# Patient Record
Sex: MALE | Race: ASIAN | URBAN - METROPOLITAN AREA
[De-identification: names, ages, dates, MRNs, and addresses within clinical notes are randomized per-mention and may not be internally consistent; named-entity substitution may affect disease eponyms.]

---

## 2022-10-03 ENCOUNTER — OFFICE VISIT (OUTPATIENT)
Dept: FAMILY MEDICINE CLINIC | Facility: CLINIC | Age: 22
End: 2022-10-03
Payer: COMMERCIAL

## 2022-10-03 VITALS
HEIGHT: 68.5 IN | TEMPERATURE: 98 F | RESPIRATION RATE: 18 BRPM | BODY MASS INDEX: 26.58 KG/M2 | SYSTOLIC BLOOD PRESSURE: 122 MMHG | DIASTOLIC BLOOD PRESSURE: 76 MMHG | WEIGHT: 177.38 LBS | HEART RATE: 72 BPM

## 2022-10-03 DIAGNOSIS — E66.3 OVERWEIGHT (BMI 25.0-29.9): ICD-10-CM

## 2022-10-03 DIAGNOSIS — Z13.89 SCREENING FOR GENITOURINARY CONDITION: ICD-10-CM

## 2022-10-03 DIAGNOSIS — Z13.0 SCREENING FOR IRON DEFICIENCY ANEMIA: ICD-10-CM

## 2022-10-03 DIAGNOSIS — R22.2 NODULE OF BUTTOCK: ICD-10-CM

## 2022-10-03 DIAGNOSIS — Z00.00 ENCOUNTER FOR ROUTINE ADULT HEALTH EXAMINATION WITHOUT ABNORMAL FINDINGS: Primary | ICD-10-CM

## 2022-10-03 DIAGNOSIS — Z13.220 SCREENING FOR LIPID DISORDERS: ICD-10-CM

## 2022-10-03 DIAGNOSIS — N47.1 PHIMOSIS OF PENIS: ICD-10-CM

## 2022-10-03 DIAGNOSIS — Z13.29 SCREENING FOR THYROID DISORDER: ICD-10-CM

## 2022-11-07 ENCOUNTER — LAB ENCOUNTER (OUTPATIENT)
Dept: LAB | Age: 22
End: 2022-11-07
Attending: STUDENT IN AN ORGANIZED HEALTH CARE EDUCATION/TRAINING PROGRAM
Payer: COMMERCIAL

## 2022-11-07 DIAGNOSIS — Z13.220 SCREENING FOR LIPID DISORDERS: ICD-10-CM

## 2022-11-07 DIAGNOSIS — Z13.89 SCREENING FOR GENITOURINARY CONDITION: ICD-10-CM

## 2022-11-07 DIAGNOSIS — Z13.0 SCREENING FOR IRON DEFICIENCY ANEMIA: ICD-10-CM

## 2022-11-07 DIAGNOSIS — Z13.29 SCREENING FOR THYROID DISORDER: ICD-10-CM

## 2022-11-07 LAB
ALBUMIN SERPL-MCNC: 4.3 G/DL (ref 3.4–5)
ALBUMIN/GLOB SERPL: 1.4 {RATIO} (ref 1–2)
ALP LIVER SERPL-CCNC: 63 U/L
ALT SERPL-CCNC: 27 U/L
ANION GAP SERPL CALC-SCNC: 7 MMOL/L (ref 0–18)
AST SERPL-CCNC: 12 U/L (ref 15–37)
BASOPHILS # BLD AUTO: 0.02 X10(3) UL (ref 0–0.2)
BASOPHILS NFR BLD AUTO: 0.3 %
BILIRUB SERPL-MCNC: 0.7 MG/DL (ref 0.1–2)
BILIRUB UR QL: NEGATIVE
BUN BLD-MCNC: 14 MG/DL (ref 7–18)
BUN/CREAT SERPL: 13.9 (ref 10–20)
CALCIUM BLD-MCNC: 9.9 MG/DL (ref 8.5–10.1)
CHLORIDE SERPL-SCNC: 103 MMOL/L (ref 98–112)
CHOLEST SERPL-MCNC: 165 MG/DL (ref ?–200)
CLARITY UR: CLEAR
CO2 SERPL-SCNC: 29 MMOL/L (ref 21–32)
COLOR UR: YELLOW
CREAT BLD-MCNC: 1.01 MG/DL
DEPRECATED RDW RBC AUTO: 40.9 FL (ref 35.1–46.3)
EOSINOPHIL # BLD AUTO: 0.17 X10(3) UL (ref 0–0.7)
EOSINOPHIL NFR BLD AUTO: 2.9 %
ERYTHROCYTE [DISTWIDTH] IN BLOOD BY AUTOMATED COUNT: 12.1 % (ref 11–15)
FASTING PATIENT LIPID ANSWER: YES
FASTING STATUS PATIENT QL REPORTED: YES
GFR SERPLBLD BASED ON 1.73 SQ M-ARVRAT: 107 ML/MIN/1.73M2 (ref 60–?)
GLOBULIN PLAS-MCNC: 3.1 G/DL (ref 2.8–4.4)
GLUCOSE BLD-MCNC: 87 MG/DL (ref 70–99)
GLUCOSE UR-MCNC: NEGATIVE MG/DL
HCT VFR BLD AUTO: 49.6 %
HDLC SERPL-MCNC: 54 MG/DL (ref 40–59)
HGB BLD-MCNC: 16.3 G/DL
HGB UR QL STRIP.AUTO: NEGATIVE
IMM GRANULOCYTES # BLD AUTO: 0.01 X10(3) UL (ref 0–1)
IMM GRANULOCYTES NFR BLD: 0.2 %
KETONES UR-MCNC: NEGATIVE MG/DL
LDLC SERPL CALC-MCNC: 99 MG/DL (ref ?–100)
LEUKOCYTE ESTERASE UR QL STRIP.AUTO: NEGATIVE
LYMPHOCYTES # BLD AUTO: 1.88 X10(3) UL (ref 1–4)
LYMPHOCYTES NFR BLD AUTO: 31.6 %
MCH RBC QN AUTO: 30.1 PG (ref 26–34)
MCHC RBC AUTO-ENTMCNC: 32.9 G/DL (ref 31–37)
MCV RBC AUTO: 91.7 FL
MONOCYTES # BLD AUTO: 0.37 X10(3) UL (ref 0.1–1)
MONOCYTES NFR BLD AUTO: 6.2 %
NEUTROPHILS # BLD AUTO: 3.49 X10 (3) UL (ref 1.5–7.7)
NEUTROPHILS # BLD AUTO: 3.49 X10(3) UL (ref 1.5–7.7)
NEUTROPHILS NFR BLD AUTO: 58.8 %
NITRITE UR QL STRIP.AUTO: NEGATIVE
NONHDLC SERPL-MCNC: 111 MG/DL (ref ?–130)
OSMOLALITY SERPL CALC.SUM OF ELEC: 288 MOSM/KG (ref 275–295)
PH UR: 7 [PH] (ref 5–8)
PLATELET # BLD AUTO: 217 10(3)UL (ref 150–450)
POTASSIUM SERPL-SCNC: 4.3 MMOL/L (ref 3.5–5.1)
PROT SERPL-MCNC: 7.4 G/DL (ref 6.4–8.2)
PROT UR-MCNC: NEGATIVE MG/DL
RBC # BLD AUTO: 5.41 X10(6)UL
SODIUM SERPL-SCNC: 139 MMOL/L (ref 136–145)
SP GR UR STRIP: 1.01 (ref 1–1.03)
TRIGL SERPL-MCNC: 63 MG/DL (ref 30–149)
TSI SER-ACNC: 2.45 MIU/ML (ref 0.36–3.74)
UROBILINOGEN UR STRIP-ACNC: <2
VIT C UR-MCNC: NEGATIVE MG/DL
VLDLC SERPL CALC-MCNC: 10 MG/DL (ref 0–30)
WBC # BLD AUTO: 5.9 X10(3) UL (ref 4–11)

## 2022-11-07 PROCEDURE — 80061 LIPID PANEL: CPT

## 2022-11-07 PROCEDURE — 84443 ASSAY THYROID STIM HORMONE: CPT

## 2022-11-07 PROCEDURE — 81003 URINALYSIS AUTO W/O SCOPE: CPT

## 2022-11-07 PROCEDURE — 80053 COMPREHEN METABOLIC PANEL: CPT

## 2022-11-07 PROCEDURE — 85025 COMPLETE CBC W/AUTO DIFF WBC: CPT

## 2022-12-13 ENCOUNTER — OFFICE VISIT (OUTPATIENT)
Facility: LOCATION | Age: 22
End: 2022-12-13
Payer: COMMERCIAL

## 2022-12-13 VITALS — HEART RATE: 62 BPM | TEMPERATURE: 97 F

## 2022-12-13 DIAGNOSIS — L05.91 PILONIDAL CYST: Primary | ICD-10-CM

## 2022-12-13 PROCEDURE — 99204 OFFICE O/P NEW MOD 45 MIN: CPT | Performed by: COLON & RECTAL SURGERY

## 2022-12-13 NOTE — PATIENT INSTRUCTIONS
I am seeing this patient in consultation regarding a pilonidal cyst.    Patient states he has had pus and drainage in the region to the left of the posterior midline in the gluteal cleft on and off since September 2020. States it builds up, pops, drains, and a cycle. He has pain at the site. It is 5/10 today. It has not been draining for the last few days. The patient has no constipation or diarrhea. He has no bright red blood from the rectum. He has no melena. He has no narrowing of the stool or mucus in the stool. He has no abdominal pain or distention. He has not suffered weight loss as a symptom. He has no family history of colon cancer. He is never been treated on the anus or rectum for either anal rectal abscess, hemorrhoids, or fistula. He has never had any surgery or drainage at the pilonidal site by operation. The patient has never been diagnosed with Crohn's disease, ulcerative colitis, or irritable bowel syndrome. Is not on any blood thinners. He has no significant illnesses. He is not on any blood thinners. Clinical exam today shows him to have a pilonidal recurring cyst and abscess in the left of the gluteal cleft at the top of the gluteal cleft approximately 4 cm off the midline. I popped the pointing site of the cyst and was able to track a sinus all the way to the midline and on the way near the tip of the tailbone spanning approximately 7 cm. It only drained blood no pus. The anus shows no fissures, fistulae, hemorrhoids, or any external disease. This patient will require pilonidal cystectomy. Since it has been stable for years, and it easily drains now, he will not require emergency surgery. The patient is at school. 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 He will be able to schedule the operation on one of his breaks. I had a very long conversation with the patient, and his mother on the phone.     I described complete excisional therapy, shaving the region, dressing changes, prolonged wound healing, and the need to be seen by the [address] [place] [address] services for assistance with dressing changes, possibly the [facility] in [facility], and return trips to me on a periodic basis. He will be scheduling the procedure through our office. He is currently scheduled for pilonidal cystectomy. The operation is on the books for January 27, 2023. All risks, benefits, complications and alternatives to the proposed procedure(s) were fully discussed with the patient. All questions from the patient were answered in detail. A description of the procedure(s) and possible outcomes was fully discussed. The patient seemed to understand the conversation and its details. Consent for the procedure(s) was confirmed with the patient.

## 2023-01-16 ENCOUNTER — TELEPHONE (OUTPATIENT)
Dept: SURGERY | Facility: CLINIC | Age: 23
End: 2023-01-16

## 2023-01-16 ENCOUNTER — OFFICE VISIT (OUTPATIENT)
Dept: SURGERY | Facility: CLINIC | Age: 23
End: 2023-01-16

## 2023-01-16 DIAGNOSIS — N47.1 PHIMOSIS: Primary | ICD-10-CM

## 2023-01-16 LAB
APPEARANCE: CLEAR
BILIRUBIN: NEGATIVE
GLUCOSE (URINE DIPSTICK): NEGATIVE MG/DL
KETONES (URINE DIPSTICK): NEGATIVE MG/DL
LEUKOCYTES: NEGATIVE
MULTISTIX LOT#: NORMAL NUMERIC
NITRITE, URINE: NEGATIVE
OCCULT BLOOD: NEGATIVE
PH, URINE: 6.5 (ref 4.5–8)
PROTEIN (URINE DIPSTICK): NEGATIVE MG/DL
SPECIFIC GRAVITY: 1.01 (ref 1–1.03)
UROBILINOGEN,SEMI-QN: 0.2 MG/DL (ref 0–1.9)

## 2023-01-16 PROCEDURE — 99204 OFFICE O/P NEW MOD 45 MIN: CPT | Performed by: UROLOGY

## 2023-01-16 PROCEDURE — 81003 URINALYSIS AUTO W/O SCOPE: CPT | Performed by: UROLOGY

## 2023-01-19 ENCOUNTER — TELEPHONE (OUTPATIENT)
Facility: LOCATION | Age: 23
End: 2023-01-19

## 2023-01-19 NOTE — TELEPHONE ENCOUNTER
Called patient this date and he is unsure of his schedule and is thinking of doing this possibly in May. He will call me directly at [phone] when ready to schedule.

## 2023-01-19 NOTE — TELEPHONE ENCOUNTER
Initiated authorization for Pilonidal Cystectomy CPT 32311 with Arjun  Case #[id number].   Status: Approved-authorization is not required per health plan for participating providers

## 2023-01-27 ENCOUNTER — HOSPITAL ENCOUNTER (OUTPATIENT)
Facility: HOSPITAL | Age: 23
Setting detail: HOSPITAL OUTPATIENT SURGERY
Discharge: HOME | End: 2023-01-27
Attending: COLON & RECTAL SURGERY | Admitting: COLON & RECTAL SURGERY
Payer: COMMERCIAL

## 2023-01-27 ENCOUNTER — ANESTHESIA EVENT (OUTPATIENT)
Dept: SURGERY | Facility: HOSPITAL | Age: 23
End: 2023-01-27
Payer: COMMERCIAL

## 2023-01-27 ENCOUNTER — ANESTHESIA (OUTPATIENT)
Dept: SURGERY | Facility: HOSPITAL | Age: 23
End: 2023-01-27
Payer: COMMERCIAL

## 2023-01-27 VITALS
TEMPERATURE: 98 F | RESPIRATION RATE: 14 BRPM | WEIGHT: 174 LBS | DIASTOLIC BLOOD PRESSURE: 62 MMHG | OXYGEN SATURATION: 99 % | BODY MASS INDEX: 26.07 KG/M2 | HEIGHT: 68.5 IN | HEART RATE: 73 BPM | SYSTOLIC BLOOD PRESSURE: 117 MMHG

## 2023-01-27 DIAGNOSIS — L05.91 PILONIDAL CYST: Primary | ICD-10-CM

## 2023-01-27 PROCEDURE — 0HB8XZZ EXCISION OF BUTTOCK SKIN, EXTERNAL APPROACH: ICD-10-PCS | Performed by: COLON & RECTAL SURGERY

## 2023-01-27 PROCEDURE — 88304 TISSUE EXAM BY PATHOLOGIST: CPT | Performed by: COLON & RECTAL SURGERY

## 2023-01-27 RX ORDER — SODIUM CHLORIDE, SODIUM LACTATE, POTASSIUM CHLORIDE, CALCIUM CHLORIDE 600; 310; 30; 20 MG/100ML; MG/100ML; MG/100ML; MG/100ML
INJECTION, SOLUTION INTRAVENOUS CONTINUOUS
Status: DISCONTINUED | OUTPATIENT
Start: 2023-01-27 | End: 2023-01-27

## 2023-01-27 RX ORDER — KETOROLAC TROMETHAMINE 30 MG/ML
INJECTION, SOLUTION INTRAMUSCULAR; INTRAVENOUS AS NEEDED
Status: DISCONTINUED | OUTPATIENT
Start: 2023-01-27 | End: 2023-01-27 | Stop reason: SURG

## 2023-01-27 RX ORDER — MAGNESIUM HYDROXIDE 1200 MG/15ML
50 LIQUID ORAL 3 TIMES DAILY
Qty: 1000 ML | Refills: 3 | Status: SHIPPED | OUTPATIENT
Start: 2023-01-27

## 2023-01-27 RX ORDER — HYDROMORPHONE HYDROCHLORIDE 1 MG/ML
0.6 INJECTION, SOLUTION INTRAMUSCULAR; INTRAVENOUS; SUBCUTANEOUS EVERY 5 MIN PRN
Status: DISCONTINUED | OUTPATIENT
Start: 2023-01-27 | End: 2023-01-27

## 2023-01-27 RX ORDER — HYDROCODONE BITARTRATE AND ACETAMINOPHEN 5; 325 MG/1; MG/1
1 TABLET ORAL ONCE AS NEEDED
Status: DISCONTINUED | OUTPATIENT
Start: 2023-01-27 | End: 2023-01-27

## 2023-01-27 RX ORDER — GLYCOPYRROLATE 0.2 MG/ML
INJECTION, SOLUTION INTRAMUSCULAR; INTRAVENOUS AS NEEDED
Status: DISCONTINUED | OUTPATIENT
Start: 2023-01-27 | End: 2023-01-27 | Stop reason: SURG

## 2023-01-27 RX ORDER — HYDROMORPHONE HYDROCHLORIDE 1 MG/ML
0.4 INJECTION, SOLUTION INTRAMUSCULAR; INTRAVENOUS; SUBCUTANEOUS EVERY 5 MIN PRN
Status: DISCONTINUED | OUTPATIENT
Start: 2023-01-27 | End: 2023-01-27

## 2023-01-27 RX ORDER — PROCHLORPERAZINE EDISYLATE 5 MG/ML
5 INJECTION INTRAMUSCULAR; INTRAVENOUS EVERY 8 HOURS PRN
Status: DISCONTINUED | OUTPATIENT
Start: 2023-01-27 | End: 2023-01-27

## 2023-01-27 RX ORDER — ONDANSETRON 2 MG/ML
INJECTION INTRAMUSCULAR; INTRAVENOUS AS NEEDED
Status: DISCONTINUED | OUTPATIENT
Start: 2023-01-27 | End: 2023-01-27 | Stop reason: SURG

## 2023-01-27 RX ORDER — HYDROMORPHONE HYDROCHLORIDE 1 MG/ML
0.2 INJECTION, SOLUTION INTRAMUSCULAR; INTRAVENOUS; SUBCUTANEOUS EVERY 5 MIN PRN
Status: DISCONTINUED | OUTPATIENT
Start: 2023-01-27 | End: 2023-01-27

## 2023-01-27 RX ORDER — HEPARIN SODIUM 5000 [USP'U]/ML
5000 INJECTION, SOLUTION INTRAVENOUS; SUBCUTANEOUS ONCE
Status: COMPLETED | OUTPATIENT
Start: 2023-01-27 | End: 2023-01-27

## 2023-01-27 RX ORDER — SCOLOPAMINE TRANSDERMAL SYSTEM 1 MG/1
1 PATCH, EXTENDED RELEASE TRANSDERMAL ONCE
Status: DISCONTINUED | OUTPATIENT
Start: 2023-01-27 | End: 2023-01-27 | Stop reason: HOSPADM

## 2023-01-27 RX ORDER — DEXAMETHASONE SODIUM PHOSPHATE 4 MG/ML
VIAL (ML) INJECTION AS NEEDED
Status: DISCONTINUED | OUTPATIENT
Start: 2023-01-27 | End: 2023-01-27 | Stop reason: SURG

## 2023-01-27 RX ORDER — ACETAMINOPHEN 500 MG
1000 TABLET ORAL ONCE AS NEEDED
Status: DISCONTINUED | OUTPATIENT
Start: 2023-01-27 | End: 2023-01-27

## 2023-01-27 RX ORDER — MIDAZOLAM HYDROCHLORIDE 1 MG/ML
1 INJECTION INTRAMUSCULAR; INTRAVENOUS EVERY 5 MIN PRN
Status: DISCONTINUED | OUTPATIENT
Start: 2023-01-27 | End: 2023-01-27

## 2023-01-27 RX ORDER — HYDROCODONE BITARTRATE AND ACETAMINOPHEN 5; 325 MG/1; MG/1
2 TABLET ORAL ONCE AS NEEDED
Status: DISCONTINUED | OUTPATIENT
Start: 2023-01-27 | End: 2023-01-27

## 2023-01-27 RX ORDER — NALOXONE HYDROCHLORIDE 0.4 MG/ML
80 INJECTION, SOLUTION INTRAMUSCULAR; INTRAVENOUS; SUBCUTANEOUS AS NEEDED
Status: DISCONTINUED | OUTPATIENT
Start: 2023-01-27 | End: 2023-01-27

## 2023-01-27 RX ORDER — NEOSTIGMINE METHYLSULFATE 1 MG/ML
INJECTION, SOLUTION INTRAVENOUS AS NEEDED
Status: DISCONTINUED | OUTPATIENT
Start: 2023-01-27 | End: 2023-01-27 | Stop reason: SURG

## 2023-01-27 RX ORDER — BUPIVACAINE HYDROCHLORIDE AND EPINEPHRINE 5; 5 MG/ML; UG/ML
INJECTION, SOLUTION EPIDURAL; INTRACAUDAL; PERINEURAL AS NEEDED
Status: DISCONTINUED | OUTPATIENT
Start: 2023-01-27 | End: 2023-01-27 | Stop reason: HOSPADM

## 2023-01-27 RX ORDER — CEFAZOLIN SODIUM/WATER 2 G/20 ML
2 SYRINGE (ML) INTRAVENOUS ONCE
Status: COMPLETED | OUTPATIENT
Start: 2023-01-27 | End: 2023-01-27

## 2023-01-27 RX ORDER — ONDANSETRON 2 MG/ML
4 INJECTION INTRAMUSCULAR; INTRAVENOUS EVERY 6 HOURS PRN
Status: DISCONTINUED | OUTPATIENT
Start: 2023-01-27 | End: 2023-01-27

## 2023-01-27 RX ORDER — MEPERIDINE HYDROCHLORIDE 25 MG/ML
12.5 INJECTION INTRAMUSCULAR; INTRAVENOUS; SUBCUTANEOUS AS NEEDED
Status: DISCONTINUED | OUTPATIENT
Start: 2023-01-27 | End: 2023-01-27

## 2023-01-27 RX ORDER — ROCURONIUM BROMIDE 10 MG/ML
INJECTION, SOLUTION INTRAVENOUS AS NEEDED
Status: DISCONTINUED | OUTPATIENT
Start: 2023-01-27 | End: 2023-01-27 | Stop reason: SURG

## 2023-01-27 RX ORDER — ACETAMINOPHEN 500 MG
1000 TABLET ORAL ONCE
Status: DISCONTINUED | OUTPATIENT
Start: 2023-01-27 | End: 2023-01-27 | Stop reason: HOSPADM

## 2023-01-27 RX ORDER — HYDROCODONE BITARTRATE AND ACETAMINOPHEN 5; 325 MG/1; MG/1
1 TABLET ORAL EVERY 6 HOURS PRN
Qty: 20 TABLET | Refills: 0 | Status: SHIPPED | OUTPATIENT
Start: 2023-01-27

## 2023-01-27 RX ORDER — LIDOCAINE HYDROCHLORIDE 10 MG/ML
INJECTION, SOLUTION EPIDURAL; INFILTRATION; INTRACAUDAL; PERINEURAL AS NEEDED
Status: DISCONTINUED | OUTPATIENT
Start: 2023-01-27 | End: 2023-01-27 | Stop reason: SURG

## 2023-01-27 RX ADMIN — ONDANSETRON 4 MG: 2 INJECTION INTRAMUSCULAR; INTRAVENOUS at 14:25:00

## 2023-01-27 RX ADMIN — GLYCOPYRROLATE 0.6 MG: 0.2 INJECTION, SOLUTION INTRAMUSCULAR; INTRAVENOUS at 14:52:00

## 2023-01-27 RX ADMIN — LIDOCAINE HYDROCHLORIDE 50 MG: 10 INJECTION, SOLUTION EPIDURAL; INFILTRATION; INTRACAUDAL; PERINEURAL at 14:20:00

## 2023-01-27 RX ADMIN — DEXAMETHASONE SODIUM PHOSPHATE 8 MG: 4 MG/ML VIAL (ML) INJECTION at 14:25:00

## 2023-01-27 RX ADMIN — CEFAZOLIN SODIUM/WATER 2 G: 2 G/20 ML SYRINGE (ML) INTRAVENOUS at 14:20:00

## 2023-01-27 RX ADMIN — ROCURONIUM BROMIDE 30 MG: 10 INJECTION, SOLUTION INTRAVENOUS at 14:20:00

## 2023-01-27 RX ADMIN — SODIUM CHLORIDE, SODIUM LACTATE, POTASSIUM CHLORIDE, CALCIUM CHLORIDE: 600; 310; 30; 20 INJECTION, SOLUTION INTRAVENOUS at 14:16:00

## 2023-01-27 RX ADMIN — KETOROLAC TROMETHAMINE 30 MG: 30 INJECTION, SOLUTION INTRAMUSCULAR; INTRAVENOUS at 14:48:00

## 2023-01-27 RX ADMIN — SODIUM CHLORIDE, SODIUM LACTATE, POTASSIUM CHLORIDE, CALCIUM CHLORIDE: 600; 310; 30; 20 INJECTION, SOLUTION INTRAVENOUS at 14:52:00

## 2023-01-27 RX ADMIN — NEOSTIGMINE METHYLSULFATE 4 MG: 1 INJECTION, SOLUTION INTRAVENOUS at 14:52:00

## 2023-01-27 NOTE — OPERATIVE REPORT
[address] Note    [place] Location: OR   [facility] [id number] MRN [id number]   Admission Date 1/27/2023 Operation Date 1/27/2023   Attending Physician Pete White MD Operating Physician Sanna Rivera MD     Pre-Operative Diagnosis: Pilonidal cyst with sinus tracts and abscess [L05.91]    Post-Operative Diagnosis: Same as above    Procedure Performed: Pilonidal Cystectomy    Surgeon(s) and Role:     Eulis Hodgkins, MD - Primary  Assistant: Sachin Olson PA-C    Anesthesia: General        Operative findings:  The patient has a pilonidal cyst with sinus tracts  The entire region was excised to clean tissue margins  It is approximately 12 cm tall, its a hockey-stick pattern to the left of midline at the apex, 4 cm wide, 3 cm deep  The wound will be packed wet to dry  The patient was delivered to recovery room in stable condition    Operative Summary:  The patient was placed in the prone position on the operating room table with the hips flexed in the jackknife position. The perineum was painted with Betadine paint. The prep extends up into the gluteal cleft and to the lower back. Sterile drapes were placed with wide exposure of the perineum, the gluteal cleft, and the lower back. The pilonidal cyst and all sinus tracts were probed and the area of disease was mapped out with blue ink. A wide excision was performed back to normal healthy tissue. This was initiated with scalpel dissection and carried out with electrocautery. Once clean, healthy, tissue was established, the region was controlled for hemostasis with electrocautery. The specimen was sent to pathology for further histopathologic diagnosis. The wound margins were infiltrated with 0.5% Marcaine with epinephrine. A sterile wet-to-dry saline dressing was applied. The patient was delivered to recovery room in stable postoperative condition.       Pathologic Specimen: Pilonidal cyst and sinus tracts            EBL: 10 cc                     Girish Jacob MD  1/27/2023  3:08 PM

## 2023-01-27 NOTE — ANESTHESIA POSTPROCEDURE EVALUATION
[address] [place] [address] Patient Status:  Hospital Outpatient Surgery   Age/Gender 20year old male MRN [id number]   [facility] SURGERY Attending Karen Cehn MD   Hosp Day # 0 PCP Cesar Mccray MD       Anesthesia Post-op Note    PILONIDAL CYSTECTOMY    Procedure Summary     Date: 01/27/23 Room / Location: [address] [place] [address] OR 11 / 1404 [facility] OR    Anesthesia Start: 6007 Anesthesia Stop: 1501    Procedure: PILONIDAL CYSTECTOMY Diagnosis:       Pilonidal cyst      (Pilonidal cyst [N62.86])    Surgeons: Karen Chen MD Anesthesiologist: Earl Quiroz MD    Anesthesia Type: general ASA Status: 1          Anesthesia Type: general    Vitals Value Taken Time   /57 01/27/23 1501   Temp 98.4 01/27/23 1501   Pulse 110 01/27/23 1501   Resp 15 01/27/23 1501   SpO2 100 01/27/23 1501       Patient Location: PACU    Anesthesia Type: general    Airway Patency: patent and extubated    Postop Pain Control: adequate    Mental Status: preanesthetic baseline    Nausea/Vomiting: none    Cardiopulmonary/Hydration status: stable euvolemic    Complications: no apparent anesthesia related complications    Postop vital signs: stable    Dental Exam: Unchanged from Preop    Patient to be discharged from PACU when criteria met.

## 2023-01-27 NOTE — ANESTHESIA PROCEDURE NOTES
Airway  Date/Time: 1/27/2023 2:22 PM  Urgency: elective    Airway not difficult    General Information and Staff    Patient location during procedure: OR  Anesthesiologist: Estela Viera MD  Resident/CRNA: Juma Farris CRNA  Performed: CRNA     Indications and Patient Condition  Indications for airway management: anesthesia  Spontaneous Ventilation: absent  Sedation level: deep  Preoxygenated: yes  Patient position: sniffing  Mask difficulty assessment: 2 - vent by mask + OA or adjuvant +/- NMBA    Final Airway Details  Final airway type: endotracheal airway      Successful airway: ETT  Cuffed: yes   Successful intubation technique: direct laryngoscopy  Facilitating devices/methods: intubating stylet  Endotracheal tube insertion site: oral  Blade: Mitali  Blade size: #4  ETT size (mm): 7.5    Cormack-Lehane Classification: grade IIA - partial view of glottis  Placement verified by: chest auscultation and capnometry   Cuff volume (mL): 7  Measured from: lips  ETT to lips (cm): 22  Number of attempts at approach: 1  Number of other approaches attempted: 0    Additional Comments  Dentition per pre op

## 2023-02-13 ENCOUNTER — OFFICE VISIT (OUTPATIENT)
Facility: LOCATION | Age: 23
End: 2023-02-13

## 2023-02-13 DIAGNOSIS — L05.91 PILONIDAL CYST: Primary | ICD-10-CM

## 2023-02-13 PROCEDURE — 99024 POSTOP FOLLOW-UP VISIT: CPT | Performed by: COLON & RECTAL SURGERY

## 2023-02-13 NOTE — PATIENT INSTRUCTIONS
This patient underwent complex pilonidal cystectomy on January 27, 2023. He is doing wet-to-dry dressings at home. He has no new complaints. He is tolerating the dressing changes. He has no reports of cellulitis, further infection, secondary tracts, or other complaints at the operative site. He has no fever or chills. Clinical exam of the gluteal cleft reveals him to have a 10 cm tall, 4 cm wide, 3.5 cm deep granulation bed. It is beautiful red granulation tissue without pus, undermining, secondary tracts, residual cyst or sinuses. There is no surrounding erythema or cellulitis. They have demonstrated good technique with the wet-to-dry dressings. This patient may resume all but extreme sports. I will see him again in 1 month. He should continue the 3 times daily dressing changes.

## 2023-03-13 ENCOUNTER — OFFICE VISIT (OUTPATIENT)
Facility: LOCATION | Age: 23
End: 2023-03-13

## 2023-03-13 VITALS — TEMPERATURE: 97 F | HEART RATE: 65 BPM

## 2023-03-13 DIAGNOSIS — L05.91 PILONIDAL CYST: Primary | ICD-10-CM

## 2023-03-13 PROCEDURE — 99024 POSTOP FOLLOW-UP VISIT: CPT | Performed by: COLON & RECTAL SURGERY

## 2023-03-20 RX ORDER — MAGNESIUM HYDROXIDE 1200 MG/15ML
50 LIQUID ORAL 3 TIMES DAILY
Qty: 1000 ML | Refills: 3 | Status: SHIPPED | OUTPATIENT
Start: 2023-03-20

## 2023-04-17 ENCOUNTER — OFFICE VISIT (OUTPATIENT)
Facility: LOCATION | Age: 23
End: 2023-04-17

## 2023-04-17 DIAGNOSIS — L05.91 PILONIDAL CYST: Primary | ICD-10-CM

## 2023-04-17 PROCEDURE — 99024 POSTOP FOLLOW-UP VISIT: CPT | Performed by: COLON & RECTAL SURGERY

## 2023-05-09 ENCOUNTER — OFFICE VISIT (OUTPATIENT)
Facility: LOCATION | Age: 23
End: 2023-05-09
Payer: COMMERCIAL

## 2023-05-09 VITALS — TEMPERATURE: 98 F | HEART RATE: 86 BPM

## 2023-05-09 DIAGNOSIS — L05.91 PILONIDAL CYST: Primary | ICD-10-CM

## 2023-05-09 PROCEDURE — 99213 OFFICE O/P EST LOW 20 MIN: CPT | Performed by: COLON & RECTAL SURGERY

## 2023-05-30 ENCOUNTER — OFFICE VISIT (OUTPATIENT)
Facility: LOCATION | Age: 23
End: 2023-05-30
Payer: COMMERCIAL

## 2023-05-30 VITALS — HEART RATE: 77 BPM | TEMPERATURE: 99 F

## 2023-05-30 DIAGNOSIS — L05.91 PILONIDAL CYST: Primary | ICD-10-CM

## 2023-05-30 PROCEDURE — 99213 OFFICE O/P EST LOW 20 MIN: CPT | Performed by: COLON & RECTAL SURGERY

## 2023-05-30 NOTE — PATIENT INSTRUCTIONS
This patient underwent pilonidal cystectomy on January 27, 2023. He is having success with dressing changes to the site. He states he remains with a very small open wound at the base of his very large prior pilonidal cystectomy incision. He has no fever or chills. No nausea or vomiting. He has no cellulitis. He reports no secondary tracts. He states that it is closing very fast and very well. Clinical exam reveals a 4 mm x 4 mm open granulation region. It took the opportunity to shave that area. There is no undermining, secondary sinus pits, secondary tracts, or evidence of a deeper pilonidal cyst.    I will see him again in 3 weeks.

## 2023-06-20 ENCOUNTER — OFFICE VISIT (OUTPATIENT)
Facility: LOCATION | Age: 23
End: 2023-06-20
Payer: COMMERCIAL

## 2023-06-20 VITALS — HEART RATE: 71 BPM | TEMPERATURE: 98 F

## 2023-06-20 DIAGNOSIS — L05.91 PILONIDAL CYST: Primary | ICD-10-CM

## 2023-06-20 PROCEDURE — 99213 OFFICE O/P EST LOW 20 MIN: CPT | Performed by: COLON & RECTAL SURGERY

## 2023-06-20 NOTE — PATIENT INSTRUCTIONS
This patient underwent pilonidal cystectomy on January 27, 2023. He has been doing dressing changes. The wound was deep. It is now completely healed. He still sees a little bit of spot of drainage, but he feels that the wound is completely sealed. He has no fever or chills. He has no purulent drainage. He has not had cellulitis. He does not report any secondary tracts or sinus pits. Clinical exam reveals the skin to be completely sealed over without granulation. There is no bloody drainage on today's exam.  There is no evidence of recurrence of the cyst or sinus tract. There is no undermining. The scar is slightly indurated. I have no further follow-up scheduled with this patient at this time. This patient can see me on an as-needed basis. This patient should return urgently for any problems or complications related to my surgical intervention. I counseled the patient regarding possible recurrence. He has been instructed to see me urgently for any problems at the operative site.

## 2023-08-03 ENCOUNTER — OFFICE VISIT (OUTPATIENT)
Facility: LOCATION | Age: 23
End: 2023-08-03
Payer: COMMERCIAL

## 2023-08-03 VITALS — TEMPERATURE: 97 F | HEART RATE: 62 BPM

## 2023-08-03 DIAGNOSIS — L05.91 PILONIDAL CYST: Primary | ICD-10-CM

## 2023-08-03 PROCEDURE — 99203 OFFICE O/P NEW LOW 30 MIN: CPT | Performed by: SURGERY

## 2023-08-17 ENCOUNTER — OFFICE VISIT (OUTPATIENT)
Facility: LOCATION | Age: 23
End: 2023-08-17

## 2023-08-17 VITALS — TEMPERATURE: 98 F | HEART RATE: 86 BPM

## 2023-08-17 DIAGNOSIS — L05.91 PILONIDAL CYST: Primary | ICD-10-CM

## 2023-08-17 PROCEDURE — 99024 POSTOP FOLLOW-UP VISIT: CPT | Performed by: SURGERY

## 2023-10-09 ENCOUNTER — OFFICE VISIT (OUTPATIENT)
Dept: FAMILY MEDICINE CLINIC | Facility: CLINIC | Age: 23
End: 2023-10-09
Payer: COMMERCIAL

## 2023-10-09 VITALS
WEIGHT: 180.63 LBS | DIASTOLIC BLOOD PRESSURE: 60 MMHG | BODY MASS INDEX: 26.75 KG/M2 | TEMPERATURE: 98 F | HEIGHT: 69.09 IN | HEART RATE: 60 BPM | RESPIRATION RATE: 16 BRPM | SYSTOLIC BLOOD PRESSURE: 112 MMHG

## 2023-10-09 DIAGNOSIS — Z00.01 ENCOUNTER FOR ROUTINE ADULT HEALTH EXAMINATION WITH ABNORMAL FINDINGS: Primary | ICD-10-CM

## 2023-10-09 DIAGNOSIS — Z23 NEED FOR VACCINATION: ICD-10-CM

## 2023-10-09 PROCEDURE — 99395 PREV VISIT EST AGE 18-39: CPT | Performed by: STUDENT IN AN ORGANIZED HEALTH CARE EDUCATION/TRAINING PROGRAM

## 2023-10-09 PROCEDURE — 90471 IMMUNIZATION ADMIN: CPT | Performed by: STUDENT IN AN ORGANIZED HEALTH CARE EDUCATION/TRAINING PROGRAM

## 2023-10-09 PROCEDURE — 3074F SYST BP LT 130 MM HG: CPT | Performed by: STUDENT IN AN ORGANIZED HEALTH CARE EDUCATION/TRAINING PROGRAM

## 2023-10-09 PROCEDURE — 3008F BODY MASS INDEX DOCD: CPT | Performed by: STUDENT IN AN ORGANIZED HEALTH CARE EDUCATION/TRAINING PROGRAM

## 2023-10-09 PROCEDURE — 3078F DIAST BP <80 MM HG: CPT | Performed by: STUDENT IN AN ORGANIZED HEALTH CARE EDUCATION/TRAINING PROGRAM

## 2023-10-09 PROCEDURE — 90686 IIV4 VACC NO PRSV 0.5 ML IM: CPT | Performed by: STUDENT IN AN ORGANIZED HEALTH CARE EDUCATION/TRAINING PROGRAM

## 2023-10-13 ENCOUNTER — LAB ENCOUNTER (OUTPATIENT)
Dept: LAB | Age: 23
End: 2023-10-13
Attending: STUDENT IN AN ORGANIZED HEALTH CARE EDUCATION/TRAINING PROGRAM
Payer: COMMERCIAL

## 2023-10-13 LAB
ALBUMIN SERPL-MCNC: 4.6 G/DL (ref 3.4–5)
ALBUMIN/GLOB SERPL: 1.2 {RATIO} (ref 1–2)
ALP LIVER SERPL-CCNC: 59 U/L
ALT SERPL-CCNC: 33 U/L
ANION GAP SERPL CALC-SCNC: 6 MMOL/L (ref 0–18)
AST SERPL-CCNC: 13 U/L (ref 15–37)
BASOPHILS # BLD AUTO: 0.01 X10(3) UL (ref 0–0.2)
BASOPHILS NFR BLD AUTO: 0.2 %
BILIRUB SERPL-MCNC: 0.8 MG/DL (ref 0.1–2)
BILIRUB UR QL STRIP.AUTO: NEGATIVE
BUN BLD-MCNC: 14 MG/DL (ref 7–18)
CALCIUM BLD-MCNC: 9.5 MG/DL (ref 8.5–10.1)
CHLORIDE SERPL-SCNC: 104 MMOL/L (ref 98–112)
CHOLEST SERPL-MCNC: 155 MG/DL (ref ?–200)
CLARITY UR REFRACT.AUTO: CLEAR
CO2 SERPL-SCNC: 26 MMOL/L (ref 21–32)
CREAT BLD-MCNC: 1.14 MG/DL
EGFRCR SERPLBLD CKD-EPI 2021: 92 ML/MIN/1.73M2 (ref 60–?)
EOSINOPHIL # BLD AUTO: 0.11 X10(3) UL (ref 0–0.7)
EOSINOPHIL NFR BLD AUTO: 2.4 %
ERYTHROCYTE [DISTWIDTH] IN BLOOD BY AUTOMATED COUNT: 11.9 %
FASTING PATIENT LIPID ANSWER: YES
FASTING STATUS PATIENT QL REPORTED: YES
GLOBULIN PLAS-MCNC: 3.8 G/DL (ref 2.8–4.4)
GLUCOSE BLD-MCNC: 89 MG/DL (ref 70–99)
GLUCOSE UR STRIP.AUTO-MCNC: NORMAL MG/DL
HCT VFR BLD AUTO: 48.7 %
HDLC SERPL-MCNC: 54 MG/DL (ref 40–59)
HGB BLD-MCNC: 16.6 G/DL
IMM GRANULOCYTES # BLD AUTO: 0.01 X10(3) UL (ref 0–1)
IMM GRANULOCYTES NFR BLD: 0.2 %
KETONES UR STRIP.AUTO-MCNC: NEGATIVE MG/DL
LDLC SERPL CALC-MCNC: 88 MG/DL (ref ?–100)
LEUKOCYTE ESTERASE UR QL STRIP.AUTO: NEGATIVE
LYMPHOCYTES # BLD AUTO: 1.77 X10(3) UL (ref 1–4)
LYMPHOCYTES NFR BLD AUTO: 39.2 %
MCH RBC QN AUTO: 30.6 PG (ref 26–34)
MCHC RBC AUTO-ENTMCNC: 34.1 G/DL (ref 31–37)
MCV RBC AUTO: 89.9 FL
MONOCYTES # BLD AUTO: 0.35 X10(3) UL (ref 0.1–1)
MONOCYTES NFR BLD AUTO: 7.7 %
NEUTROPHILS # BLD AUTO: 2.27 X10 (3) UL (ref 1.5–7.7)
NEUTROPHILS # BLD AUTO: 2.27 X10(3) UL (ref 1.5–7.7)
NEUTROPHILS NFR BLD AUTO: 50.3 %
NITRITE UR QL STRIP.AUTO: NEGATIVE
NONHDLC SERPL-MCNC: 101 MG/DL (ref ?–130)
OSMOLALITY SERPL CALC.SUM OF ELEC: 282 MOSM/KG (ref 275–295)
PH UR STRIP.AUTO: 6 [PH] (ref 5–8)
PLATELET # BLD AUTO: 225 10(3)UL (ref 150–450)
POTASSIUM SERPL-SCNC: 3.9 MMOL/L (ref 3.5–5.1)
PROT SERPL-MCNC: 8.4 G/DL (ref 6.4–8.2)
PROT UR STRIP.AUTO-MCNC: NEGATIVE MG/DL
RBC # BLD AUTO: 5.42 X10(6)UL
RBC UR QL AUTO: NEGATIVE
SODIUM SERPL-SCNC: 136 MMOL/L (ref 136–145)
SP GR UR STRIP.AUTO: 1.01 (ref 1–1.03)
TRIGL SERPL-MCNC: 64 MG/DL (ref 30–149)
TSI SER-ACNC: 3.15 MIU/ML (ref 0.36–3.74)
UROBILINOGEN UR STRIP.AUTO-MCNC: NORMAL MG/DL
VLDLC SERPL CALC-MCNC: 10 MG/DL (ref 0–30)
WBC # BLD AUTO: 4.5 X10(3) UL (ref 4–11)

## 2023-10-13 PROCEDURE — 84443 ASSAY THYROID STIM HORMONE: CPT | Performed by: STUDENT IN AN ORGANIZED HEALTH CARE EDUCATION/TRAINING PROGRAM

## 2023-10-13 PROCEDURE — 80053 COMPREHEN METABOLIC PANEL: CPT | Performed by: STUDENT IN AN ORGANIZED HEALTH CARE EDUCATION/TRAINING PROGRAM

## 2023-10-13 PROCEDURE — 85025 COMPLETE CBC W/AUTO DIFF WBC: CPT | Performed by: STUDENT IN AN ORGANIZED HEALTH CARE EDUCATION/TRAINING PROGRAM

## 2023-10-13 PROCEDURE — 80061 LIPID PANEL: CPT | Performed by: STUDENT IN AN ORGANIZED HEALTH CARE EDUCATION/TRAINING PROGRAM

## 2023-10-13 PROCEDURE — 81003 URINALYSIS AUTO W/O SCOPE: CPT | Performed by: STUDENT IN AN ORGANIZED HEALTH CARE EDUCATION/TRAINING PROGRAM

## (undated) DEVICE — PAD SACRAL PREMIUM 12X12X1

## (undated) DEVICE — MINI LAP PACK-LF: Brand: MEDLINE INDUSTRIES, INC.

## (undated) DEVICE — SLEEVE KENDALL SCD EXPRESS MED

## (undated) DEVICE — SPONGE STICK WITH PVP-I: Brand: KENDALL

## (undated) DEVICE — CURAD PAPER TAPE 2IN

## (undated) DEVICE — GAUZE,SPONGE,FLUFF,6"X6.75",STRL,5/TRAY: Brand: MEDLINE

## (undated) DEVICE — SOL NACL IRRIG 0.9% 1000ML BTL

## (undated) DEVICE — STERILE POLYISOPRENE POWDER-FREE SURGICAL GLOVES: Brand: PROTEXIS

## (undated) DEVICE — PAD,ABDOMINAL,8"X7.5",ST,LF,20/BX: Brand: MEDLINE INDUSTRIES, INC.

## (undated) NOTE — LETTER
05/10/23    Patient: Tania Wright  : 2000 Visit date: 2023    Dear  Tedra Ahumada, MD    Thank you for referring Tania Wright to my practice. Please find my assessment and plan below. Assessment   Pilonidal cyst    Plan   The patient presents for continued care and evaluation following a pilonidal cystectomy on 2023. The patient states he thinks the wound is entirely healed. He states that the wound stopped draining 4 to 5 days ago. He has been placing a dry gauze over the wound bed. He denies any pain over the area. Clinical exam reveals an entirely healed wound bed, but upon palpation, I am able to split open the distal aspect of the wound as a skin bridge formed over the top of the wound. The wound bed remains approximately 5 cm long by 3 cm wide by 3 cm deep. There is healthy pink granulation tissue throughout the wound bed. I took the opportunity at today's visit to shave the peripheries of the wound bed. I instructed the patient to continue dressing changes. He should place dry gauze within the wound bed to allow it to heal by secondary intention. We will see him every 3 weeks until the wound has entirely healed, so we can shave the peripheries of the wound bed. The patient expressed understanding. We will see him again in 3 weeks for wound recheck.            Sincerely,       Keyana Bach MD   CC:   No Recipients

## (undated) NOTE — LETTER
23    Patient: Laura Moreno  : 2000 Visit date: 2023    Dear  Luiz Hernandez MD    Thank you for referring Laura Moreno to my practice. Please find my assessment and plan below. Assessment   Pilonidal cyst  (primary encounter diagnosis)    Plan   This patient underwent pilonidal cystectomy on 2023. He has been doing dressing changes. The wound was deep. It is now completely healed. He still sees a little bit of spot of drainage, but he feels that the wound is completely sealed. He has no fever or chills. He has no purulent drainage. He has not had cellulitis. He does not report any secondary tracts or sinus pits. Clinical exam reveals the skin to be completely sealed over without granulation. There is no bloody drainage on today's exam.  There is no evidence of recurrence of the cyst or sinus tract. There is no undermining. The scar is slightly indurated. I have no further follow-up scheduled with this patient at this time. This patient can see me on an as-needed basis. This patient should return urgently for any problems or complications related to my surgical intervention. I counseled the patient regarding possible recurrence. He has been instructed to see me urgently for any problems at the operative site.     Sincerely,     Moriah Weinstein MD   CC:   No Recipients

## (undated) NOTE — LETTER
23    Patient: Festus Guadalupe  : 2000 Visit date: 3/13/2023    Dear  Benson Jauregui MD    Thank you for referring Festus Guadalupe to my practice. Please find my assessment and plan below. Assessment   Postop state  Pilonidal cyst      Plan   The patient presents for continued care and evaluation following a pilonidal cystectomy on 2023. The patient states that he has been doing very well postoperatively. He is performing wet-to-dry dressing changes 3 times a day. His girlfriend is helping him in performing these dressing changes. He states the amount of drainage from the wound is about the same. He states he occasionally has some bleeding when changing the dressings, but it is not a significant amount. He denies any pain in the area. Clinical exam of the wound reveals a 6 cm long by 3 cm wide by approximately 3 cm deep wound. There is some tunneling at the apex of the wound. I took the opportunity at today's visit to probe this location, there is no sinus tract present. There is some bridging of his skin causing this minor tunnel. There is healthy pink granulation tissue throughout the wound bed. There is no active drainage or bleeding. I took the opportunity at today's visit to shave the margins of his wound bed. The patient should continue wet-to-dry dressing changes 3 times daily. I instructed him to shave the peripheries of the wound bed as needed to promote wound healing. I would like the patient to follow-up with me in approximately 4 to 5 weeks for wound recheck. He expressed understanding to the above plan. All questions and concerns were addressed.            Sincerely,       Bhargavi Sarkar MD   CC: No Recipients

## (undated) NOTE — LETTER
22    Patient: Rose Marie Drew  : 2000 Visit date: 2022    Dear  Zee Yu MD    Thank you for referring Rose Marie Drew to my practice. Please find my assessment and plan below. Assessment   Pilonidal cyst  (primary encounter diagnosis)      Plan   I am seeing this patient in consultation regarding a pilonidal cyst.    Patient states he has had pus and drainage in the region to the left of the posterior midline in the gluteal cleft on and off since 2020. States it builds up, pops, drains, and a cycle. He has pain at the site. It is 5/10 today. It has not been draining for the last few days. The patient has no constipation or diarrhea. He has no bright red blood from the rectum. He has no melena. He has no narrowing of the stool or mucus in the stool. He has no abdominal pain or distention. He has not suffered weight loss as a symptom. He has no family history of colon cancer. He is never been treated on the anus or rectum for either anal rectal abscess, hemorrhoids, or fistula. He has never had any surgery or drainage at the pilonidal site by operation. The patient has never been diagnosed with Crohn's disease, ulcerative colitis, or irritable bowel syndrome. Is not on any blood thinners. He has no significant illnesses. He is not on any blood thinners. Clinical exam today shows him to have a pilonidal recurring cyst and abscess in the left of the gluteal cleft at the top of the gluteal cleft approximately 4 cm off the midline. I popped the pointing site of the cyst and was able to track a sinus all the way to the midline and on the way near the tip of the tailbone spanning approximately 7 cm. It only drained blood no pus. The anus shows no fissures, fistulae, hemorrhoids, or any external disease. This patient will require pilonidal cystectomy.   Since it has been stable for years, and it easily drains now, he will not require emergency surgery. The patient is at school. 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 He will be able to schedule the operation on one of his breaks. I had a very long conversation with the patient, and his mother on the phone. I described complete excisional therapy, shaving the region, dressing changes, prolonged wound healing, and the need to be seen by the [address] [place] [address] for assistance with dressing changes, possibly the [facility] in [facility], and return trips to me on a periodic basis. He will be scheduling the procedure through our office. He is currently scheduled for pilonidal cystectomy. The operation is on the books for January 27, 2023.           Sincerely,       Ulices Samuel MD   CC: No Recipients

## (undated) NOTE — LETTER
23    Patient: Parker Avalos  : 2000 Visit date: 2023    Dear  Tamie Lindsay MD    Thank you for referring Parker Avalos to my practice. Please find my assessment and plan below. Assessment   Pilonidal cyst  (primary encounter diagnosis)    Plan   This patient underwent pilonidal cystectomy on 2023. He is having success with dressing changes to the site. He states he remains with a very small open wound at the base of his very large prior pilonidal cystectomy incision. He has no fever or chills. No nausea or vomiting. He has no cellulitis. He reports no secondary tracts. He states that it is closing very fast and very well. Clinical exam reveals a 4 mm x 4 mm open granulation region. It took the opportunity to shave that area. There is no undermining, secondary sinus pits, secondary tracts, or evidence of a deeper pilonidal cyst.    I will see him again in 3 weeks.        Sincerely,       Christopher Holt MD   CC:   No Recipients

## (undated) NOTE — LETTER
23    Patient: Asiya Carlos  : 2000 Visit date: 2023    Dear  Yani Gray MD    Thank you for referring Asiya Carlos to my practice. Please find my assessment and plan below. Assessment   Pilonidal cyst  (primary encounter diagnosis)      Plan   This patient underwent complex pilonidal cystectomy on 2023. He is doing wet-to-dry dressings at home. He has no new complaints. He is tolerating the dressing changes. He has no reports of cellulitis, further infection, secondary tracts, or other complaints at the operative site. He has no fever or chills. Clinical exam of the gluteal cleft reveals him to have a 10 cm tall, 4 cm wide, 3.5 cm deep granulation bed. It is beautiful red granulation tissue without pus, undermining, secondary tracts, residual cyst or sinuses. There is no surrounding erythema or cellulitis. They have demonstrated good technique with the wet-to-dry dressings. This patient may resume all but extreme sports. I will see him again in 1 month. He should continue the 3 times daily dressing changes.            Sincerely,       Ciera Cullen MD   CC: No Recipients

## (undated) NOTE — LETTER
23    Patient: Shira Anthony  : 2000 Visit date: 8/3/2023    Dear  Tia Beyer MD    Thank you for referring Shira Anthony to my practice. Please find my assessment and plan below.           Sincerely,       Tamiko Rodriguez MD   CC:   No Recipients

## (undated) NOTE — LETTER
23    Patient: Montell Rubinstein  : 2000 Visit date: 2023    Dear  Desi Hernandez MD    Thank you for referring Montell Rubinstein to my practice. Please find my assessment and plan below. Assessment   Pilonidal cyst    Plan   The patient presents for continued care and evaluation following a pilonidal cystectomy on 2023. The patient states that he continues to do well postoperatively. He states he feels like the wound is healing in 2 parts. He states the portion of the wound at the apex of his gluteal cleft is healing quicker than the portion closer to the anal margin. He continues wet-to-dry dressing changes 3 times daily. He has not been shaving the peripheries of the wound bed. He states he continues to have some drainage from the wound, but it has not changed since the time of his operation. The patient states he has some soreness in the area when sitting for prolonged periods. He states when he stands up, he then has a \"shooting pain. \"  The pain is tolerable. The patient is currently at the [employer]. He graduates on May 13, 2023. Clinical exam of the wound reveals a 6 cm long by 3 cm wide by approximately 1 cm deep wound extending from the apex of the gluteal cleft down toward the anal margin. There is a significant amount of hair surrounding the wound bed and preventing the wound from further healing. I took the opportunity at today's visit to shave the peripheries of the wound bed. There is healthy pink granulation tissue throughout the wound bed. We will continue to see this patient approximately every 3 weeks until his wound has entirely healed. I explained the edges of the wound bed need to be shaved for it to adequately heal.    We will see this patient again after his graduation once he has moved back home. The patient expressed understanding with the above plan. All questions and concerns were addressed.      Sincerely, Girish Jacob MD